# Patient Record
Sex: MALE | Race: BLACK OR AFRICAN AMERICAN | NOT HISPANIC OR LATINO | URBAN - METROPOLITAN AREA
[De-identification: names, ages, dates, MRNs, and addresses within clinical notes are randomized per-mention and may not be internally consistent; named-entity substitution may affect disease eponyms.]

---

## 2023-02-08 ENCOUNTER — HOSPITAL ENCOUNTER (EMERGENCY)
Facility: OTHER | Age: 43
Discharge: HOME | End: 2023-02-08
Attending: EMERGENCY MEDICINE
Payer: COMMERCIAL

## 2023-02-08 VITALS
DIASTOLIC BLOOD PRESSURE: 78 MMHG | WEIGHT: 200 LBS | SYSTOLIC BLOOD PRESSURE: 136 MMHG | OXYGEN SATURATION: 98 % | HEART RATE: 80 BPM | HEIGHT: 66 IN | RESPIRATION RATE: 16 BRPM | BODY MASS INDEX: 32.14 KG/M2 | TEMPERATURE: 98 F

## 2023-02-08 DIAGNOSIS — S00.211A EYELID ABRASION, RIGHT, INITIAL ENCOUNTER: Primary | ICD-10-CM

## 2023-02-08 LAB
HCV AB SERPL QL IA: NEGATIVE
HIV 1+2 AB+HIV1 P24 AG SERPL QL IA: NEGATIVE

## 2023-02-08 PROCEDURE — 99283 EMERGENCY DEPT VISIT LOW MDM: CPT

## 2023-02-08 PROCEDURE — 86803 HEPATITIS C AB TEST: CPT | Performed by: EMERGENCY MEDICINE

## 2023-02-08 PROCEDURE — 87389 HIV-1 AG W/HIV-1&-2 AB AG IA: CPT | Performed by: EMERGENCY MEDICINE

## 2023-02-08 RX ORDER — PROPARACAINE HYDROCHLORIDE 5 MG/ML
1 SOLUTION/ DROPS OPHTHALMIC
Status: DISCONTINUED | OUTPATIENT
Start: 2023-02-08 | End: 2023-02-08

## 2023-02-08 NOTE — FIRST PROVIDER EVALUATION
Emergency Department TeleTriage Encounter Note      CHIEF COMPLAINT    Chief Complaint   Patient presents with    eye pain      Pt c.o pain in right eye after accident at work yesterday. Pt states he ran into mirror on cab of truck.  AAO x 3 nadn skin w.d  mild abrasion to right eye        VITAL SIGNS   Initial Vitals [02/08/23 1625]   BP Pulse Resp Temp SpO2   133/82 86 18 98.4 °F (36.9 °C) 97 %      MAP       --            ALLERGIES    Review of patient's allergies indicates:  No Known Allergies    PROVIDER TRIAGE NOTE  This is a teletriage evaluation of a 42 y.o. male presenting to the ED complaining of eye pain. Patient hit his head on a mirror while he was working on a truck yesterday. He has abrasion to the right eyelid. He reports mild pain to the right eyeball. Denies vision changes.    Patient is alert and oriented. He speaks in complete sentences. He is sitting upright in the chair in no distress. Abrasion to upper right eyelid noted.    Initial orders will be placed and care will be transferred to an alternate provider when patient is roomed for a full evaluation. Any additional orders and the final disposition will be determined by that provider.         ORDERS  Labs Reviewed   HIV 1 / 2 ANTIBODY   HEPATITIS C ANTIBODY       ED Orders (720h ago, onward)      Start Ordered     Status Ordering Provider    02/08/23 1633 02/08/23 1632  Visual acuity screening  Once         Ordered KEENAN DREW    02/08/23 1626 02/08/23 1626  HIV 1/2 Ag/Ab (4th Gen)  STAT         Ordered NICK JENNINGS    02/08/23 1626 02/08/23 1626  Hepatitis C Antibody  STAT         Ordered NICK JENNINGS              Virtual Visit Note: The provider triage portion of this emergency department evaluation and documentation was performed via [employer], a HIPAA-compliant telemedicine application, in concert with a tele-presenter in the room. A face to face patient evaluation with one of my colleagues will occur once the patient is  placed in an emergency department room.      DISCLAIMER: This note was prepared with [employer] voice recognition transcription software. Garbled syntax, mangled pronouns, and other bizarre constructions may be attributed to that software system.

## 2023-02-08 NOTE — Clinical Note
"Larisa"Rafael Chery was seen and treated in our emergency department on 2/8/2023.  He may return to work on 02/09/2023.       If you have any questions or concerns, please don't hesitate to call.      Mónica Blake MD"

## 2023-02-09 NOTE — ED PROVIDER NOTES
Encounter Date: 2/8/2023    SCRIBE #1 NOTE: I, Hellen Drew, am scribing for, and in the presence of,  Mónica Blake MD.     History     Chief Complaint   Patient presents with    eye pain      Pt c.o pain in right eye after accident at work yesterday. Pt states he ran into mirror on cab of truck.  AAO x 3 nadn skin w.d  mild abrasion to right eye      Time seen by provider: 6:50 PM    This is a 42 y.o. male who presents with complaint of right eye pain for one day. Patient states that he ran into a mirror yesterday at work around 3 PM. He reports an abrasion to the right eyelid. He denies any loss of consciousness or other injury. Patient notes he has a history of eye issues but states his vision has not changed from baseline. He denies any photophobia but states he has slight pain when looking to the left. He states that after the injury, his eye and surrounding skin were throbbing, but his pain has improved today.  He denies any foreign body sensation.  Denies any redness or tearing.  Denies any photophobia.  Patient has an appointment scheduled with his ophthalmologist for 2/16. This is the extent of the patient's complaints at this time.    The history is provided by the patient.   Review of patient's allergies indicates:  No Known Allergies  History reviewed. No pertinent past medical history.  History reviewed. No pertinent surgical history.  History reviewed. No pertinent family history.  Social History     Tobacco Use    Smoking status: Never    Smokeless tobacco: Current   Substance Use Topics    Alcohol use: Yes    Drug use: Never     Review of Systems   Constitutional:  Negative for chills and fever.   Eyes:  Positive for pain. Negative for photophobia and visual disturbance.   Gastrointestinal:  Negative for nausea and vomiting.   Musculoskeletal:  Negative for back pain and neck pain.   Skin:  Positive for wound.   Neurological:  Negative for dizziness and headaches.     Physical Exam     Initial  Vitals [02/08/23 1625]   BP Pulse Resp Temp SpO2   133/82 86 18 98.4 °F (36.9 °C) 97 %      MAP       --         Physical Exam    Nursing note and vitals reviewed.  Constitutional: He appears well-developed and well-nourished.   HENT:   Head: Normocephalic.   Small abrasion to the right upper lid.   Eyes: EOM are normal. Pupils are equal, round, and reactive to light.   Disconjugate gaze. Slightly injected sclera bilaterally. Slightly increased redness of sclera to medial aspect of right eye. 4 mm pupils.     Neck:   Normal range of motion.  Pulmonary/Chest: No respiratory distress.   Musculoskeletal:      Cervical back: Normal range of motion.     Neurological: He is alert and oriented to person, place, and time.   Ambulatory with steady gait.   Skin: Skin is warm and dry. Capillary refill takes less than 2 seconds.       ED Course   Procedures  Labs Reviewed   HIV 1 / 2 ANTIBODY    Narrative:     Release to patient->Immediate   HEPATITIS C ANTIBODY    Narrative:     Release to patient->Immediate          Imaging Results    None          Medications - No data to display  Medical Decision Making:   History:   Old Medical Records: I decided to obtain old medical records.  Old Records Summarized: other records and records from another hospital.  Initial Assessment:   6:50PM:  Patient is a 42-year-old male who presents to the emergency department after an eye injury yesterday.  Patient appears well, nontoxic.  He denies any foreign body sensation and there is no evidence of ocular injury on exam.  He does have a superficial abrasion to the right eyelid.  I do not feel that further imaging is indicated at this time.  I do not feel that further work up in the ED is indicated at this time.  I counseled pt regarding supportive care measures.  I have discussed with the pt ED return warnings and need for close PCP f/u.  Pt agreeable to plan and all questions answered.  I feel that pt is stable for discharge and management as  an outpatient and no further intervention is needed at this time.  Pt is comfortable returning to the ED if needed.  Will DC home in stable condition.                          Clinical Impression:   Final diagnoses:  [S00.211A] Eyelid abrasion, right, initial encounter (Primary)        ED Disposition Condition    Discharge Stable          ED Prescriptions    None       Follow-up Information       Follow up With Specialties Details Why Contact Info    Primary Care Physician                 Mónica Blake MD  02/08/23 1956